# Patient Record
Sex: FEMALE | Race: OTHER | HISPANIC OR LATINO | Employment: UNEMPLOYED | URBAN - METROPOLITAN AREA
[De-identification: names, ages, dates, MRNs, and addresses within clinical notes are randomized per-mention and may not be internally consistent; named-entity substitution may affect disease eponyms.]

---

## 2018-07-06 ENCOUNTER — HOSPITAL ENCOUNTER (EMERGENCY)
Facility: HOSPITAL | Age: 58
Discharge: HOME | End: 2018-07-06
Attending: EMERGENCY MEDICINE
Payer: COMMERCIAL

## 2018-07-06 VITALS
RESPIRATION RATE: 18 BRPM | TEMPERATURE: 97 F | OXYGEN SATURATION: 100 % | SYSTOLIC BLOOD PRESSURE: 110 MMHG | DIASTOLIC BLOOD PRESSURE: 70 MMHG | HEART RATE: 86 BPM

## 2018-07-06 DIAGNOSIS — J06.9 ACUTE URI: Primary | ICD-10-CM

## 2018-07-06 LAB
BACTERIA UR QL AUTO: ABNORMAL /HPF
BILIRUB UR QL STRIP: NEGATIVE
CLARITY UR: CLEAR
COLOR UR: YELLOW
GLUCOSE UR STRIP-MCNC: NEGATIVE MG/DL
HGB UR QL STRIP.AUTO: NEGATIVE
KETONES UR STRIP-MCNC: NEGATIVE MG/DL
LEUKOCYTE ESTERASE UR QL STRIP: 25
NITRITE UR QL STRIP: NEGATIVE
NON-SQ EPI CELLS URNS QL MICRO: ABNORMAL /HPF
PH UR STRIP.AUTO: 5 [PH] (ref 4.5–8)
PROT UR STRIP-MCNC: NEGATIVE MG/DL
RBC #/AREA URNS AUTO: ABNORMAL /HPF
SP GR UR STRIP.AUTO: 1.02 (ref 1–1.04)
UROBILINOGEN UA: NEGATIVE MG/DL
WBC #/AREA URNS AUTO: ABNORMAL /HPF

## 2018-07-06 PROCEDURE — 81001 URINALYSIS AUTO W/SCOPE: CPT | Performed by: PHYSICIAN ASSISTANT

## 2018-07-06 PROCEDURE — 99283 EMERGENCY DEPT VISIT LOW MDM: CPT

## 2018-07-06 RX ORDER — LISINOPRIL 20 MG/1
20 TABLET ORAL DAILY
COMMUNITY

## 2018-07-06 RX ORDER — SIMVASTATIN 10 MG
10 TABLET ORAL
COMMUNITY

## 2018-07-06 RX ORDER — LEVOTHYROXINE SODIUM 0.12 MG/1
125 TABLET ORAL DAILY
COMMUNITY

## 2018-07-06 RX ORDER — GUAIFENESIN AND CODEINE PHOSPHATE 100; 10 MG/5ML; MG/5ML
5 SOLUTION ORAL 3 TIMES DAILY PRN
Qty: 120 ML | Refills: 0 | Status: SHIPPED | OUTPATIENT
Start: 2018-07-06 | End: 2018-07-16

## 2018-07-06 NOTE — DISCHARGE INSTRUCTIONS
Síntomas de resfriado   LO QUE NECESITA SABER:   Un resfriado es kaiser infección causada por un virus  La infección causa la inflamación del sistema respiratorio superior  Algunos síntomas comunes de un resfriado incluyen estornudos, garganta seca, congestión nasal, dolor de pierre, ojos llorosos, y tos  La tos podría ser seca o incluso contener flemas  Usted también podría sentir josep musculares, dolor en las articulaciones y cansancio  La fiebre no es un síntoma común del resfrío barbi podría suceder  La mayoría de los resfriados se [place] sin tratamiento  INSTRUCCIONES SOBRE EL CARLOS HOSPITALARIA:   Regrese a la rubén de emergencias si:   · Usted siente más cansancio y debilidad  · Usted no puede comer  · Gamez corazón está latiendo [employer] rápido de lo normal en gamez arti  · Usted nota manchas matthew en la parte de atrás de gamez garganta y gamez coretta está inflamado y adolorido al tacto  · Usted nota puntos rojos o morados pequeños o grandes en gamez piel  Pregúntele a gamez Wenda Gamma vitaminas y minerales son adecuados para usted  · Usted tiene fiebre por encima de los 102°F (38 9°C)  · Usted tiene falta de la aire que es reciente o que MARY  · Usted tiene kaiser secreción espesa saliéndole de la nariz por más de 2 días  · Andria síntomas no mejoran o más mack empeoran en cuestión de 5 días  · Usted tiene preguntas o inquietudes acerca de gamez condición o cuidado  Medicamentos:  Los [employer]  podrían  ser parte de los medicamentos sugeridos por gamez médico para disminuir los síntomas de gamez resfrío  Estos medicamentos están disponibles sin receta médica  Pregunte cuáles medicamentos gorge y cuándo tomarlos  [facility]  · Los AINEs o el acetaminofén  ayudan a bajar la fiebre o disminuir el dolor  Los descongestionantes    · Los descongestionantes  ayudan a disminuir la congestión nasal      · Los antihistamínicos  ayudan a disminuir los estornudos y el flujo nasal  · Los jarabes para la tos  ayudan a disminuir la tos  · Los expectorantes  ayudan a aflojar las flemas para que las pueda toser  · Peosta ron medicamentos liborio se le haya indicado  Consulte con gamez médico si usted corey que gamez medicamento no le está ayudando o si presenta efectos secundarios  Infórmele si es alérgico a algún medicamento  Mantenga kaiser lista actualizada de los [place], las vitaminas y los productos herbales que benji  Incluya los siguientes datos de los medicamentos: cantidad, frecuencia y motivo de administración  Traiga con usted la lista o los envases de la píldoras a ron citas de seguimiento  Lleve la lista de los medicamentos con usted en arti de kaiser emergencia  Alivio de los síntomas:  Los siguientes podría ayudar a aliviar los síntomas del resfrío, liborio por ejemplo la garganta seca y la congestión:  · Gárgaras con enjuague bucal o agua salada tibia según las instrucciones  · Pastillas para la garganta o golosinas duras  · Vaporizador de usha fría o tibia o humidificador para aliviar la respiración  · Curran de por lo menos 2 días y luego según lo necesario para ir eliminando el cansancio y la debilidad  · Póngase crema a base de petrolato alrededor de las fosas nasales para disminuir la irritación causada por estarse limpiando tanto la nariz  Peosta líquidos:  Los líquidos le ayudarán a aflojar y disolver la mucosidad espesa para que la pueda expectorar  Los líquidos también lo mantendrán hidratado  Pregúntele a gamez médico cuáles líquidos le recomienda y cuánta cantidad debe gorge cada día  Prevenga la propagación de gérmenes:  Es posible propagar los gérmenes del resfriado a otras personas por lo menos por 3 días después de shannon iniciado los síntomas  Lávese las paradise frecuentemente  No comparta artículos, liborio utensilios de cocina  Cúbrase la nariz y la boca cuando tose o estornuda usando la parte de adentro del codo en vez de las paradise   Tire en [address] toallas desechables que usó para limpiarse la nariz  No fume:  El fumar podría empeorar ron síntomas y aumentar la duración de rivera resfriado  Hable con rivera médico si necesita ayuda para dejar de fumar  Acuda a ron consultas de control con rivera médico según le indicaron  Anote ron preguntas para que se acuerde de hacerlas jose l ron visitas  © 2017 [address] Information is for End User's use only and may not be sold, redistributed or otherwise used for commercial purposes  All illustrations and images included in CareNotes® are the copyrighted property of A D A M , Inc  or Ismael Grace  Esta información es sólo para uso en educación  Rivera intención no es darle un consejo médico sobre enfermedades o tratamientos  Colsulte con rivera Hardik Ahle farmacéutico antes de seguir cualquier régimen médico para saber si es seguro y efectivo para usted

## 2021-06-30 NOTE — ED PROVIDER NOTES
History  Chief Complaint   Patient presents with    Cold Like Symptoms     pt states that she started 3 days ago with HA, cough and sore throat  pt denies taking any OTC meds today  pt states that she was around other people that was sick  pt just came here monday from [address]      53-year-old female with history of hypertension hypothyroidism and hyperlipidemia presents emergency department for evaluation cough, nasal congestion, sore throat, and general malaise x3 days  Patient denies associated fever, chills, sweats, chest pain, nausea, vomiting, abdominal pain, back pain, or leg swelling  She has no ill contacts at home  She also states that she is having mild dysuria, it feels hot when I pee  Denies urinary urgency frequency, no hematuria  Has taken 1 dose of acetaminophen yesterday, no over-the-counter medications taken today  She traveled from [facility] within the past month  Denies history of tobacco use  Prior to Admission Medications   Prescriptions Last Dose Informant Patient Reported? Taking?   levothyroxine 125 mcg tablet  Self Yes Yes   Sig: Take 125 mcg by mouth daily   lisinopril (ZESTRIL) 20 mg tablet  Self Yes Yes   Sig: Take 20 mg by mouth daily   simvastatin (ZOCOR) 10 mg tablet  Self Yes Yes   Sig: Take 10 mg by mouth daily at bedtime      Facility-Administered Medications: None       Past Medical History:   Diagnosis Date    Disease of thyroid gland     Hyperlipidemia     Hypertension        Past Surgical History:   Procedure Laterality Date    CHOLECYSTECTOMY      HYSTERECTOMY      THYROID SURGERY         History reviewed  No pertinent family history  I have reviewed and agree with the history as documented  Social History   Substance Use Topics    Smoking status: Never Smoker    Smokeless tobacco: Never Used    Alcohol use No        Review of Systems   Constitutional: Positive for fatigue  Negative for chills, diaphoresis and fever     HENT: Positive for Patient Information     Patient Name MRN Kelsie Cabrera [id number] Female 2000      Addendum Note by Rubin Gil NP at 2017  4:13 PM     Author:  Rubin Gil NP Service:  (none) Author Type:  PHYS- Nurse Practitioner     Filed:  2017  4:13 PM Encounter Date:  2017 Status:  Signed     :  Rubin Gil NP (PHYS- Nurse Practitioner)       Addended by: RUBIN GIL on: 2017 04:13 PM        Modules accepted: Orders            congestion and sore throat  Respiratory: Positive for cough  Negative for shortness of breath  Cardiovascular: Negative for chest pain  Gastrointestinal: Negative for diarrhea, nausea and vomiting  Genitourinary: Positive for dysuria  Negative for frequency and urgency  Musculoskeletal: Negative for arthralgias and myalgias  Skin: Negative for rash  Allergic/Immunologic: Negative for immunocompromised state  Neurological: Negative for dizziness and light-headedness  Physical Exam  Physical Exam   Constitutional: She is oriented to person, place, and time  She appears well-developed and well-nourished  No distress  HENT:   Head: Normocephalic and atraumatic  Right Ear: Tympanic membrane is not injected and not bulging  Left Ear: Tympanic membrane is not injected and not bulging  Mouth/Throat: Oropharynx is clear and moist  No oropharyngeal exudate  Eyes: Pupils are equal, round, and reactive to light  No scleral icterus  Cardiovascular: Normal rate and regular rhythm  Exam reveals no gallop and no friction rub  No murmur heard  Pulmonary/Chest: No stridor  No respiratory distress  She has no wheezes  She has no rales  Abdominal: Soft  Bowel sounds are normal  She exhibits no distension  There is no tenderness  There is no guarding  Lymphadenopathy:     She has no cervical adenopathy  Neurological: She is alert and oriented to person, place, and time  Skin: Skin is warm and dry  Capillary refill takes less than 2 seconds  She is not diaphoretic  Psychiatric: She has a normal mood and affect  Her behavior is normal    Vitals reviewed        Vital Signs  ED Triage Vitals [07/06/18 1623]   Temperature Pulse Respirations Blood Pressure SpO2   (!) 97 °F (36 1 °C) 86 18 110/70 100 %      Temp Source Heart Rate Source Patient Position - Orthostatic VS BP Location FiO2 (%)   Temporal Monitor Sitting Left arm --      Pain Score       No Pain           Vitals:    07/06/18 1623 BP: 110/70   Pulse: 86   Patient Position - Orthostatic VS: Sitting       Visual Acuity      ED Medications  Medications - No data to display    Diagnostic Studies  Results Reviewed     Procedure Component Value Units Date/Time    Urine Microscopic [[id number]]  (Abnormal) Collected:  07/06/18 1702    Lab Status:  Final result Specimen:  Urine from Urine, Clean Catch Updated:  07/06/18 1735     RBC, UA 0-1 (A) /hpf      WBC, UA 0-1 (A) /hpf      Epithelial Cells Moderate (A) /hpf      Bacteria, UA Occasional /hpf     UA w Reflex to Microscopic [[id number]]  (Abnormal) Collected:  07/06/18 1702    Lab Status:  Final result Specimen:  Urine from Urine, Clean Catch Updated:  07/06/18 1723     Color, UA Yellow     Clarity, UA Clear     Specific [place], UA 1 020     pH, UA 5 0     Leukocytes, UA 25 0 (A)     Nitrite, UA Negative     Protein, UA Negative mg/dl      Glucose, UA Negative mg/dl      Ketones, UA Negative mg/dl      Bilirubin, UA Negative     Blood, UA Negative     UROBILINOGEN UA Negative mg/dL                  No orders to display              Procedures  Procedures       Phone Contacts  ED Phone Contact    ED Course                               MDM  Number of Diagnoses or Management Options  Acute URI:   Diagnosis management comments: 66-year-old female presents with clinical symptoms consistent with viral upper respiratory infection  Physical exam is unremarkable and vital signs are normal  I do not have any suspicion for pneumonia, bronchitis, acute otitis media, or strep pharyngitis  In addition she reports mild dysuria, urinalysis is unremarkable  Discussed home supportive remedies and typical disease course         Amount and/or Complexity of Data Reviewed  Clinical lab tests: ordered and reviewed  Tests in the medicine section of CPT®: ordered and reviewed  Review and summarize past medical records: yes      CritCare Time    Disposition  Final diagnoses:   Acute URI     Time reflects when diagnosis was documented in both MDM as applicable and the Disposition within this note     Time User Action Codes Description Comment    7/6/2018  6:01 PM Rebecca Pedroza Add [J06 9] Acute URI       ED Disposition     ED Disposition Condition Comment    Discharge  [place] discharge to home/self care  Condition at discharge: Good        Follow-up Information     Follow up With Specialties Details Why [facility] Emergency Medicine  If symptoms worsen [address] [address] [phone]          Discharge Medication List as of 7/6/2018  6:05 PM      START taking these medications    Details   guaifenesin-codeine (GUAIFENESIN AC) 100-10 MG/5ML liquid Take 5 mL by mouth 3 (three) times a day as needed for cough for up to 10 days, Starting Fri 7/6/2018, Until Mon 7/16/2018, Print         CONTINUE these medications which have NOT CHANGED    Details   levothyroxine 125 mcg tablet Take 125 mcg by mouth daily, Historical Med      lisinopril (ZESTRIL) 20 mg tablet Take 20 mg by mouth daily, Historical Med      simvastatin (ZOCOR) 10 mg tablet Take 10 mg by mouth daily at bedtime, Historical Med           No discharge procedures on file      ED Provider  Electronically Signed by           Sindhu Rodgers PA-C  07/06/18 8521 Cryotherapy Text: The wound bed was treated with cryotherapy after the biopsy was performed.

## 2023-03-19 ENCOUNTER — HOSPITAL ENCOUNTER (EMERGENCY)
Facility: HOSPITAL | Age: 63
Discharge: HOME | End: 2023-03-19
Attending: EMERGENCY MEDICINE

## 2023-03-19 VITALS
WEIGHT: 149 LBS | RESPIRATION RATE: 18 BRPM | TEMPERATURE: 96.7 F | HEART RATE: 97 BPM | OXYGEN SATURATION: 98 % | DIASTOLIC BLOOD PRESSURE: 72 MMHG | SYSTOLIC BLOOD PRESSURE: 138 MMHG

## 2023-03-19 DIAGNOSIS — K13.70 UNSPECIFIED LESIONS OF ORAL MUCOSA: Primary | ICD-10-CM

## 2023-03-19 LAB
ANION GAP SERPL CALCULATED.3IONS-SCNC: 8 MMOL/L (ref 4–13)
BASOPHILS # BLD AUTO: 0.03 THOUSANDS/ÂΜL (ref 0–0.1)
BASOPHILS NFR BLD AUTO: 0 % (ref 0–1)
BUN SERPL-MCNC: 14 MG/DL (ref 5–25)
CALCIUM SERPL-MCNC: 9.2 MG/DL (ref 8.4–10.2)
CHLORIDE SERPL-SCNC: 103 MMOL/L (ref 96–108)
CO2 SERPL-SCNC: 28 MMOL/L (ref 21–32)
CREAT SERPL-MCNC: 0.84 MG/DL (ref 0.6–1.3)
EOSINOPHIL # BLD AUTO: 0.17 THOUSAND/ÂΜL (ref 0–0.61)
EOSINOPHIL NFR BLD AUTO: 2 % (ref 0–6)
ERYTHROCYTE [DISTWIDTH] IN BLOOD BY AUTOMATED COUNT: 12.7 % (ref 11.6–15.1)
GFR SERPL CREATININE-BSD FRML MDRD: 73 ML/MIN/1.73SQ M
GLUCOSE SERPL-MCNC: 102 MG/DL (ref 65–140)
HCT VFR BLD AUTO: 41.3 % (ref 34.8–46.1)
HGB BLD-MCNC: 13.3 G/DL (ref 11.5–15.4)
HIV 1+2 AB+HIV1 P24 AG SERPL QL IA: NORMAL
HIV1 P24 AG SER QL: NORMAL
IMM GRANULOCYTES # BLD AUTO: 0.03 THOUSAND/UL (ref 0–0.2)
IMM GRANULOCYTES NFR BLD AUTO: 0 % (ref 0–2)
LYMPHOCYTES # BLD AUTO: 1.65 THOUSANDS/ÂΜL (ref 0.6–4.47)
LYMPHOCYTES NFR BLD AUTO: 18 % (ref 14–44)
MCH RBC QN AUTO: 30.2 PG (ref 26.8–34.3)
MCHC RBC AUTO-ENTMCNC: 32.2 G/DL (ref 31.4–37.4)
MCV RBC AUTO: 94 FL (ref 82–98)
MONOCYTES # BLD AUTO: 0.47 THOUSAND/ÂΜL (ref 0.17–1.22)
MONOCYTES NFR BLD AUTO: 5 % (ref 4–12)
NEUTROPHILS # BLD AUTO: 6.81 THOUSANDS/ÂΜL (ref 1.85–7.62)
NEUTS SEG NFR BLD AUTO: 75 % (ref 43–75)
NRBC BLD AUTO-RTO: 0 /100 WBCS
PLATELET # BLD AUTO: 364 THOUSANDS/UL (ref 149–390)
PMV BLD AUTO: 9 FL (ref 8.9–12.7)
POTASSIUM SERPL-SCNC: 3.8 MMOL/L (ref 3.5–5.3)
RBC # BLD AUTO: 4.41 MILLION/UL (ref 3.81–5.12)
SODIUM SERPL-SCNC: 139 MMOL/L (ref 135–147)
WBC # BLD AUTO: 9.16 THOUSAND/UL (ref 4.31–10.16)

## 2023-03-19 RX ORDER — CHLORHEXIDINE GLUCONATE 0.12 MG/ML
15 RINSE ORAL 2 TIMES DAILY
Qty: 120 ML | Refills: 0 | Status: SHIPPED | OUTPATIENT
Start: 2023-03-19

## 2023-03-19 RX ADMIN — ALUMINUM HYDROXIDE, MAGNESIUM HYDROXIDE, AND SIMETHICONE 10 ML: 200; 200; 20 SUSPENSION ORAL at 22:00

## 2023-03-20 LAB — TREPONEMA PALLIDUM IGG+IGM AB [PRESENCE] IN SERUM OR PLASMA BY IMMUNOASSAY: NORMAL

## 2023-03-20 NOTE — DISCHARGE INSTRUCTIONS
We will call with any outstanding positive test results and make any necessary changes to treatment plan at that time  Follow-up with primary care provider, dentist   Return to ED for any worsening symptoms as discussed

## 2023-03-20 NOTE — ED PROVIDER NOTES
History  Chief Complaint   Patient presents with   • Dental Pain     Pt reports bilateral lower dental/gum pain a well as r side tongue pain from previous dental work cutting her tongue  20-year-old female past medical history of hypertension, hyperlipidemia, hypothyroidism presents to emergency department complaining of gum pain described as burning around L lower molars x 3 days  Also R sided tongue pain after cutting it on old dental paste x 3 days  She denies any tooth pain  Is here for vacation since the beginning of this month  History provided by:  Patient and relative   used: Yes    Dental Pain  Context: dental caries and poor dentition    Context: not recent dental surgery and not trauma    Relieved by:  Nothing  Worsened by:  Nothing  Ineffective treatments:  None tried  Associated symptoms: gum swelling and oral lesions    Associated symptoms: no congestion, no difficulty swallowing, no drooling, no facial pain, no facial swelling, no fever, no headaches, no neck pain, no neck swelling, no oral bleeding and no trismus    Risk factors: no cancer and no immunosuppression        Prior to Admission Medications   Prescriptions Last Dose Informant Patient Reported? Taking?   levothyroxine 125 mcg tablet   Yes No   Sig: Take 125 mcg by mouth daily   lisinopril (ZESTRIL) 20 mg tablet   Yes No   Sig: Take 20 mg by mouth daily   simvastatin (ZOCOR) 10 mg tablet   Yes No   Sig: Take 10 mg by mouth daily at bedtime      Facility-Administered Medications: None       Past Medical History:   Diagnosis Date   • Disease of thyroid gland    • Hyperlipidemia    • Hypertension        Past Surgical History:   Procedure Laterality Date   • CHOLECYSTECTOMY     • HYSTERECTOMY     • THYROID SURGERY         History reviewed  No pertinent family history  I have reviewed and agree with the history as documented      E-Cigarette/Vaping     E-Cigarette/Vaping Substances   • Nicotine No    • THC No    • CBD No    • Flavoring No    • Other No    • Unknown No      Social History     Tobacco Use   • Smoking status: Never   • Smokeless tobacco: Never   Substance Use Topics   • Alcohol use: No   • Drug use: No       Review of Systems   Constitutional: Negative for chills and fever  HENT: Positive for mouth sores  Negative for congestion, dental problem, drooling, ear pain, facial swelling, rhinorrhea, sore throat, trouble swallowing and voice change  Respiratory: Negative for shortness of breath  Cardiovascular: Negative for chest pain and palpitations  Gastrointestinal: Negative for abdominal pain, nausea and vomiting  Musculoskeletal: Negative for neck pain  Skin: Negative for color change, rash and wound  Neurological: Negative for headaches  All other systems reviewed and are negative  Physical Exam  Physical Exam  Vitals and nursing note reviewed  Constitutional:       General: She is not in acute distress  Appearance: Normal appearance  She is not ill-appearing, toxic-appearing or diaphoretic  HENT:      Head: Normocephalic and atraumatic  Jaw: No trismus, tenderness or swelling  Comments: No facial swelling      Right Ear: Tympanic membrane, ear canal and external ear normal       Left Ear: Tympanic membrane, ear canal and external ear normal       Nose: Nose normal       Mouth/Throat:      Lips: Pink  No lesions  Mouth: Mucous membranes are moist  No injury, lacerations or angioedema  Dentition: Gingival swelling, dental caries and gum lesions (clustered vesicles along gum line with mild surrounding swelling and erthyema of last 3 L lower molars) present  No dental tenderness or dental abscesses  Tongue: Lesions (aphthous ulcer R side of tongue ) present  Palate: No lesions  Pharynx: Oropharynx is clear  Uvula midline  No pharyngeal swelling, oropharyngeal exudate, posterior oropharyngeal erythema or uvula swelling        Comments: Patient maintaining airway and secretions  No stridor   No brawniness under tongue  Eyes:      Conjunctiva/sclera: Conjunctivae normal    Neck:      Trachea: Phonation normal    Cardiovascular:      Rate and Rhythm: Normal rate and regular rhythm  Heart sounds: Normal heart sounds  Pulmonary:      Effort: Pulmonary effort is normal  No respiratory distress  Breath sounds: Normal breath sounds  No stridor  Musculoskeletal:      Cervical back: Full passive range of motion without pain and neck supple  No rigidity or tenderness  Lymphadenopathy:      Cervical: No cervical adenopathy  Skin:     General: Skin is warm and dry  Capillary Refill: Capillary refill takes less than 2 seconds  Findings: No lesion or rash  Neurological:      Mental Status: She is alert  Cranial Nerves: No cranial nerve deficit  Gait: Gait normal          Vital Signs  ED Triage Vitals [03/19/23 2103]   Temperature Pulse Respirations Blood Pressure SpO2   (!) 96 7 °F (35 9 °C) 97 18 138/72 98 %      Temp Source Heart Rate Source Patient Position - Orthostatic VS BP Location FiO2 (%)   Tympanic Monitor Sitting Left arm --      Pain Score       10 - Worst Possible Pain           Vitals:    03/19/23 2103   BP: 138/72   Pulse: 97   Patient Position - Orthostatic VS: Sitting         Visual Acuity      ED Medications  Medications   al mag oxide-diphenhydramine-lidocaine viscous (MAGIC MOUTHWASH) suspension 10 mL (10 mL Swish & Spit Given 3/19/23 2200)       Diagnostic Studies  Results Reviewed     Procedure Component Value Units Date/Time    RPR-Syphilis Screening (Total Syphilis IGG/IGM) [[id number]]  (Normal) Collected: 03/19/23 2207    Lab Status: Final result Specimen: Blood from Arm, Left Updated: 03/20/23 0933     SYPHILIS TOTAL ANTIBODY Non-reactive    Narrative:      Test performed on the [employer] system using multiplex flow immunoassay methodology      RAPID HIV 1/2 AB-AG COMBO for 15years old and above [[id number]]  (Normal) Collected: 03/19/23 2207    Lab Status: Final result Specimen: Blood from Arm, Left Updated: 03/19/23 2243     Rapid HIV 1 AND 2 Non-Reactive     HIV-1 P24 Ag Screen Non-Reactive    Narrative:      Negative for HIV-1 p24 Antigen  Negative for HIV-1 and/or HIV-2 Antibody      Basic metabolic panel [[id number]] Collected: 03/19/23 2207    Lab Status: Final result Specimen: Blood from Arm, Left Updated: 03/19/23 2232     Sodium 139 mmol/L      Potassium 3 8 mmol/L      Chloride 103 mmol/L      CO2 28 mmol/L      ANION GAP 8 mmol/L      BUN 14 mg/dL      Creatinine 0 84 mg/dL      Glucose 102 mg/dL      Calcium 9 2 mg/dL      eGFR 73 ml/min/1 73sq m     Narrative:      Raina guidelines for Chronic Kidney Disease (CKD):   •  Stage 1 with normal or high GFR (GFR > 90 mL/min/1 73 square meters)  •  Stage 2 Mild CKD (GFR = 60-89 mL/min/1 73 square meters)  •  Stage 3A Moderate CKD (GFR = 45-59 mL/min/1 73 square meters)  •  Stage 3B Moderate CKD (GFR = 30-44 mL/min/1 73 square meters)  •  Stage 4 Severe CKD (GFR = 15-29 mL/min/1 73 square meters)  •  Stage 5 End Stage CKD (GFR <15 mL/min/1 73 square meters)  Note: GFR calculation is accurate only with a steady state creatinine    CBC and differential [[id number]] Collected: 03/19/23 2207    Lab Status: Final result Specimen: Blood from Arm, Left Updated: 03/19/23 2215     WBC 9 16 Thousand/uL      RBC 4 41 Million/uL      Hemoglobin 13 3 g/dL      Hematocrit 41 3 %      MCV 94 fL      MCH 30 2 pg      MCHC 32 2 g/dL      RDW 12 7 %      MPV 9 0 fL      Platelets 034 Thousands/uL      nRBC 0 /100 WBCs      Neutrophils Relative 75 %      Immat GRANS % 0 %      Lymphocytes Relative 18 %      Monocytes Relative 5 %      Eosinophils Relative 2 %      Basophils Relative 0 %      Neutrophils Absolute 6 81 Thousands/µL      Immature Grans Absolute 0 03 Thousand/uL      Lymphocytes Absolute 1 65 Thousands/µL      Monocytes Absolute 0 47 Thousand/µL      Eosinophils Absolute 0 17 Thousand/µL      Basophils Absolute 0 03 Thousands/µL                  No orders to display              Procedures  Procedures         ED Course  ED Course as of 03/22/23 2002   Sun Mar 19, 2023   2143 Verbal consent for HIV testing given by patient using Slovenian Interpretor  SBIRT 22yo+    Flowsheet Row Most Recent Value   SBIRT (23 yo +)    In order to provide better care to our patients, we are screening all of our patients for alcohol and drug use  Would it be okay to ask you these screening questions? No Filed at: 03/19/2023 2245                    Medical Decision Making  Oral lesions  No systemic symptoms  VSS  Afebrile  No dental abscess  No facial swelling, difficulty swallowing or breathing  No trismus  No voice changes  Tolerating PO without difficulty  No respiratory distress  no pseudomembrane  No evidence of acute necrotizing ulcerative gingivitis  Low clinical suspicion for deep space infection at this time- do not suspect Sandeep's angina, peritonsillar abscess, retropharyngeal abscess  Concern for immune compromise, verbal consent for HIV/syphillis testing given  Rapid HIV negative  Labs grossly normal  Plan for supportive care with magic mouthwash, PCP follow up  All imaging and/or lab testing discussed with patient, strict return to ED precautions discussed  Patient recommended to follow up promptly with appropriate outpatient provider  Patient and/or family members verbalizes understanding and agrees with plan  Patient and/or family members were given opportunity to ask questions, all questions were answered at this time  Patient is stable for discharge      Portions of the record may have been created with voice recognition software  Occasional wrong word or "sound a like" substitutions may have occurred due to the inherent limitations of voice recognition software   Read the chart carefully and recognize, using context, where substitutions have occurred  Amount and/or Complexity of Data Reviewed  Labs: ordered  Risk  Prescription drug management  Disposition  Final diagnoses:   Unspecified lesions of oral mucosa     Time reflects when diagnosis was documented in both MDM as applicable and the Disposition within this note     Time User Action Codes Description Comment    3/19/2023 10:47 PM Drake Parham Add [K13 [id number]] Unspecified lesions of oral mucosa       ED Disposition     ED Disposition   Discharge    Condition   Stable    Date/Time   Sun Mar 19, 2023 10:48 PM    Comment   Emmanuel Valdivia discharge to home/self care                 Follow-up Information     Follow up With Specialties Details Why Contact Info Additional [address] [facility] Schedule an appointment as soon as possible for a visit  For follow up regarding your symptoms [address], [address] [facility] [address]  [address], [address], [address] [place], [place], [address] Luong [address] Dentistry   [address] [id number]  [facility], [address], [place], [place], [address], [phone]          Discharge Medication List as of 3/19/2023 10:50 PM      START taking these medications    Details   al mag oxide-diphenhydramine-lidocaine viscous (MAGIC MOUTHWASH) 1:1:1 suspension Swish and spit 10 mL every 6 (six) hours as needed for mouth pain or discomfort, Starting Sun 3/19/2023, Print         CONTINUE these medications which have NOT CHANGED    Details   levothyroxine 125 mcg tablet Take 125 mcg by mouth daily, Historical Med      lisinopril (ZESTRIL) 20 mg tablet Take 20 mg by mouth daily, Historical Med      simvastatin (ZOCOR) 10 mg tablet Take 10 mg by mouth daily at bedtime, Historical Med             No discharge procedures on file      PDMP Review     None          ED Provider  Electronically Signed by           Alesia Aguilar PA-C  03/22/23 2002